# Patient Record
Sex: FEMALE | Race: WHITE | Employment: OTHER | URBAN - METROPOLITAN AREA
[De-identification: names, ages, dates, MRNs, and addresses within clinical notes are randomized per-mention and may not be internally consistent; named-entity substitution may affect disease eponyms.]

---

## 2020-10-12 ENCOUNTER — TRANSFERRED RECORDS (OUTPATIENT)
Dept: HEALTH INFORMATION MANAGEMENT | Facility: CLINIC | Age: 70
End: 2020-10-12

## 2020-10-19 ENCOUNTER — MEDICAL CORRESPONDENCE (OUTPATIENT)
Dept: HEALTH INFORMATION MANAGEMENT | Facility: CLINIC | Age: 70
End: 2020-10-19

## 2020-10-19 ENCOUNTER — TRANSFERRED RECORDS (OUTPATIENT)
Dept: HEALTH INFORMATION MANAGEMENT | Facility: CLINIC | Age: 70
End: 2020-10-19

## 2020-10-20 ENCOUNTER — OFFICE VISIT (OUTPATIENT)
Dept: OPHTHALMOLOGY | Facility: CLINIC | Age: 70
End: 2020-10-20
Attending: OPHTHALMOLOGY
Payer: COMMERCIAL

## 2020-10-20 DIAGNOSIS — H16.012 CENTRAL CORNEAL ULCER OF LEFT EYE: Primary | ICD-10-CM

## 2020-10-20 LAB
GRAM STN SPEC: NORMAL
GRAM STN SPEC: NORMAL
SPECIMEN SOURCE: NORMAL

## 2020-10-20 PROCEDURE — 65430 CORNEAL SMEAR: CPT | Performed by: OPHTHALMOLOGY

## 2020-10-20 PROCEDURE — 99204 OFFICE O/P NEW MOD 45 MIN: CPT | Mod: 25 | Performed by: OPHTHALMOLOGY

## 2020-10-20 PROCEDURE — G0463 HOSPITAL OUTPT CLINIC VISIT: HCPCS

## 2020-10-20 PROCEDURE — 87102 FUNGUS ISOLATION CULTURE: CPT | Performed by: OPHTHALMOLOGY

## 2020-10-20 PROCEDURE — 87205 SMEAR GRAM STAIN: CPT | Performed by: OPHTHALMOLOGY

## 2020-10-20 PROCEDURE — 87076 CULTURE ANAEROBE IDENT EACH: CPT | Performed by: OPHTHALMOLOGY

## 2020-10-20 PROCEDURE — 92285 EXTERNAL OCULAR PHOTOGRAPHY: CPT | Performed by: OPHTHALMOLOGY

## 2020-10-20 PROCEDURE — 87075 CULTR BACTERIA EXCEPT BLOOD: CPT | Performed by: OPHTHALMOLOGY

## 2020-10-20 PROCEDURE — 87070 CULTURE OTHR SPECIMN AEROBIC: CPT | Performed by: OPHTHALMOLOGY

## 2020-10-20 RX ORDER — OMEPRAZOLE 40 MG/1
40 CAPSULE, DELAYED RELEASE ORAL
COMMUNITY
Start: 2020-05-11

## 2020-10-20 RX ORDER — PHENOL 1.4 %
600 AEROSOL, SPRAY (ML) MUCOUS MEMBRANE
COMMUNITY
Start: 2020-05-29

## 2020-10-20 RX ORDER — GABAPENTIN 300 MG/1
600 CAPSULE ORAL
COMMUNITY
Start: 2020-05-22

## 2020-10-20 RX ORDER — BUSPIRONE HYDROCHLORIDE 30 MG/1
30 TABLET ORAL
COMMUNITY
Start: 2020-08-28

## 2020-10-20 RX ORDER — MULTIPLE VITAMINS W/ MINERALS TAB 9MG-400MCG
1 TAB ORAL
COMMUNITY
Start: 2020-06-19

## 2020-10-20 RX ORDER — HYDROXYZINE HYDROCHLORIDE 25 MG/1
25 TABLET, FILM COATED ORAL
COMMUNITY
Start: 2020-06-03

## 2020-10-20 RX ORDER — MOXIFLOXACIN 5 MG/ML
1 SOLUTION/ DROPS OPHTHALMIC
COMMUNITY
End: 2020-10-22

## 2020-10-20 RX ORDER — ACETAMINOPHEN 500 MG
500-1000 TABLET ORAL
COMMUNITY
Start: 2020-08-19

## 2020-10-20 RX ORDER — TOBRAMYCIN 3 MG/ML
1 SOLUTION/ DROPS OPHTHALMIC
COMMUNITY
End: 2020-10-22

## 2020-10-20 RX ORDER — CAPSAICIN 0.025 %
CREAM (GRAM) TOPICAL
COMMUNITY

## 2020-10-20 RX ORDER — DULOXETIN HYDROCHLORIDE 30 MG/1
30 CAPSULE, DELAYED RELEASE ORAL
COMMUNITY
Start: 2020-05-22

## 2020-10-20 RX ORDER — ALBUTEROL SULFATE 90 UG/1
2 AEROSOL, METERED RESPIRATORY (INHALATION)
COMMUNITY
Start: 2020-04-24

## 2020-10-20 RX ORDER — AMOXICILLIN 250 MG
2 CAPSULE ORAL
COMMUNITY
Start: 2020-05-11

## 2020-10-20 RX ORDER — ASPIRIN 81 MG/1
81 TABLET, CHEWABLE ORAL
COMMUNITY
Start: 2020-06-04

## 2020-10-20 RX ORDER — ROSUVASTATIN CALCIUM 10 MG/1
10 TABLET, COATED ORAL
COMMUNITY
Start: 2020-04-24

## 2020-10-20 RX ORDER — CARBOXYMETHYLCELLULOSE SODIUM 10 MG/ML
1 GEL OPHTHALMIC
COMMUNITY

## 2020-10-20 RX ORDER — FLUOXETINE 40 MG/1
40 CAPSULE ORAL
COMMUNITY

## 2020-10-20 ASSESSMENT — CONF VISUAL FIELD
OS_SUPERIOR_TEMPORAL_RESTRICTION: 1
METHOD: COUNTING FINGERS
OS_INFERIOR_NASAL_RESTRICTION: 1
OD_NORMAL: 1
OS_INFERIOR_TEMPORAL_RESTRICTION: 1
OS_SUPERIOR_NASAL_RESTRICTION: 1

## 2020-10-20 ASSESSMENT — VISUAL ACUITY
OS_CC: CF @ 2'
METHOD: SNELLEN - LINEAR
OD_CC: 20/30
CORRECTION_TYPE: GLASSES

## 2020-10-20 ASSESSMENT — REFRACTION_WEARINGRX
OS_AXIS: 107
OS_CYLINDER: +0.25
OD_SPHERE: -1.25
SPECS_TYPE: BIFOCAL
OD_ADD: +2.50
OD_AXIS: 054
OS_SPHERE: -1.00
OD_CYLINDER: +0.50
OS_ADD: +2.50

## 2020-10-20 ASSESSMENT — TONOMETRY
OS_IOP_MMHG: 08
IOP_METHOD: ICARE
OD_IOP_MMHG: 10

## 2020-10-20 ASSESSMENT — EXTERNAL EXAM - RIGHT EYE: OD_EXAM: NORMAL

## 2020-10-20 ASSESSMENT — EXTERNAL EXAM - LEFT EYE: OS_EXAM: NORMAL

## 2020-10-20 NOTE — PROGRESS NOTES
CC:  Corneal ulcer OS    Referring provider: Tulio Pop    HPI:  Gloria Rinaldi is a(n) 70 year old female who presents for non-healing corneal ulcer. She had vision loss over the course of the past ~2 months. Then over the past week, she developed severe pain, which started last Thursday. Pain is a lot worse today than yesterday. Has been on Moxi and tobra q2h alternating which hasn't helped.     POHx:    No h/o diabetes.     Glasses: Yes  CTL wearer: No    Family hx of eye disease: None  Social Hx: (current every day smoker) smoking, (-) EtOH, (-) illicit drugs    Meds:  Moxifloxacin q2h alternating  Tobramycin q2h alternating    Surgical Hx:  CEIOL each eye  [facility] right eye- 10/11/20    A/P:  #Corneal ulcer, left eye  - Possibly neurotrophic with h/o subacute vision loss over months, possibly with superimposed bacterial keratitis  - Attempted corneal sensation testing but patient too uncomfortable  - culture at Dr. Pop's office 10/11/20 were NGTD  - repeat cultures today 10/20/20 (on moxi and tobra for at least 9 days)  Fortified Vanco and Tobra left eye q 1 hour.  Cultures today    #Right eye, Dry eye - Systane Ultra PF or Celluvisc right eye every 2-3  hours.    #Pseudophakia each eye  - s/p [facility] right eye recently    Spend 40 minutes with pt for discussion and planning.  Time Statement Example:     I spent a total of ____40____ minutes face to face with _the patient___during today's office visit. Over 50% of this time was spent counseling the patient and/or coordinating care regarding__her dry eyes__and ocular infection____.     Follow up:     Thursday, weekend Tues and thurs (next week)    Call sooner if worsens    Kassy Whitney MD  Cornea & External Disease Fellow    Attending Physician Attestation:  Complete documentation of historical and exam elements from today's encounter can be found in the full encounter summary report (not reduplicated in this progress note).  I personally obtained the  chief complaint(s) and history of present illness.  I confirmed and edited as necessary the review of systems, past medical/surgical history, family history, social history, and examination findings as documented by others; and I examined the patient myself.  I personally reviewed the relevant tests, images, and reports as documented above.  I formulated and edited as necessary the assessment and plan and discussed the findings and management plan with the patient and family. - Lv Kapoor MD   --    Attending Physician Attestation: I was present for the key portions of the procedure and immediately  available for the remainder. - Lv Kapoor MD

## 2020-10-20 NOTE — PATIENT INSTRUCTIONS
Systane Ultra or Refresh Celluvisc - use 6x/day in BOTH EYES. Separate vials for each eye.    STOP the antibiotics you were previously using.      To the LEFT EYE:    BEGIN FORTIFIED VANCOMYCIN EVERY 1 HOUR (must be refrigerated) AROUND THE CLOCK    BEGIN FORTIFIED TOBRAMYCIN EVERY 1 HOUR (must be refrigerated) AROUND THE CLOCK    Starting Wed 10/21, YOU CAN USE EVERY 1 HOUR WHILE AWAKE, AND EVERY 2 HOURS OVERNIGHT

## 2020-10-20 NOTE — LETTER
10/20/2020       RE: Gloria Rinaldi  [address]  [place] [address]     Dear Colleague,    Thank you for referring your patient, Gloria Rinaldi, to the [facility] at [facility]. Please see a copy of my visit note below.      HPI:  Gloria Rinaldi is a(n) 70 year old female who presents for non-healing corneal ulcer. She had vision loss over the course of the past ~2 months. Then over the past week, she developed severe pain, which started last Thursday. Pain is a lot worse today than yesterday. Has been on Moxi and tobra q2h alternating which hasn't helped.     POHx:    No h/o diabetes.     Glasses: Yes  CTL wearer: No    Family hx of eye disease: None  Social Hx: (current every day smoker) smoking, (-) EtOH, (-) illicit drugs    Meds:  Moxifloxacin q2h alternating  Tobramycin q2h alternating    Surgical Hx:  CEIOL each eye  [facility] right eye- 10/11/20    A/P:  #Corneal ulcer, left eye  - Possibly neurotrophic with h/o subacute vision loss over months, possibly with superimposed bacterial keratitis  - Attempted corneal sensation testing but patient too uncomfortable  - culture at Dr. Pop's office 10/11/20 were NGTD  - repeat cultures today 10/20/20 (on moxi and tobra for at least 9 days)  Fortified Vanco and Tobra left eye q 1 hour.  Cultures today    right eye Dry eye - Systane Ultra PF or Celluvisc right eye every 2-3  hours.      #Pseudophakia each eye  - s/p [facility] right eye recently    Spend 40 minutes with pt for discussion and planning.      Follow up:     Thursday, weekend Tues and thurs (next week)    Call sooner if worsens      CC:  Corneal ulcer OS    Referring provider: Alison    HPI:  Gloria Rinaldi is a(n) 70 year old female who presents for non-healing corneal ulcer. She had vision loss over the course of the past ~2 months. Then over the past week, she developed severe pain, which started last Thursday. Pain is a lot worse today  than yesterday. Has been on Moxi and tobra q2h alternating which hasn't helped.     POHx:    No h/o diabetes.     Glasses: Yes  CTL wearer: No    Family hx of eye disease: None  Social Hx: (current every day smoker) smoking, (-) EtOH, (-) illicit drugs    Meds:  Moxifloxacin q2h alternating  Tobramycin q2h alternating    Surgical Hx:  CEIOL each eye  [facility] right eye- 10/11/20    A/P:  #Corneal ulcer, left eye  - Possibly neurotrophic with h/o subacute vision loss over months, possibly with superimposed bacterial keratitis  - Attempted corneal sensation testing but patient too uncomfortable  - culture at Dr. Pop's office 10/11/20 were NGTD  - repeat cultures today 10/20/20 (on moxi and tobra for at least 9 days)  Fortified Vanco and Tobra left eye q 1 hour.  Cultures today    right eye Dry eye - Systane Ultra PF or Celluvisc right eye every 2-3  hours.      #Pseudophakia each eye  - s/p [facility] right eye recently    Spend 40 minutes with pt for discussion and planning.      Follow up:     Thursday, weekend Tues and thurs (next week)    Call sooner if worsens      Again, thank you for allowing me to participate in the care of your patient.      Sincerely,    Lv Kapoor MD

## 2020-10-20 NOTE — NURSING NOTE
Chief Complaints and History of Present Illnesses   Patient presents with     Corneal Ulcer Evaluation     Non healing central corneal ulcer      Chief Complaint(s) and History of Present Illness(es)     Corneal Ulcer Evaluation     Laterality: left eye    Comments: Non healing central corneal ulcer               Comments     Non healing corneal ulcer LE.  Pt states that the ulcer has gotten much worse over the past month. Pt thought that she was seeing  A floater in LE and not an ulcer.  Continuous stabbing eye pain in LE. Pt also notes constant headache around LE for the past 3 days.  Pt currently taking Moxifloxicin and tobramycin Q2H Left Eye    JETHRO Florian October 20, 2020 2:05 PM

## 2020-10-22 ENCOUNTER — OFFICE VISIT (OUTPATIENT)
Dept: OPHTHALMOLOGY | Facility: CLINIC | Age: 70
End: 2020-10-22
Attending: STUDENT IN AN ORGANIZED HEALTH CARE EDUCATION/TRAINING PROGRAM
Payer: COMMERCIAL

## 2020-10-22 DIAGNOSIS — Z96.1 PSEUDOPHAKIA OF BOTH EYES: ICD-10-CM

## 2020-10-22 DIAGNOSIS — H04.123 DRY EYE SYNDROME OF BOTH EYES: ICD-10-CM

## 2020-10-22 DIAGNOSIS — H16.012 CENTRAL CORNEAL ULCER OF LEFT EYE: Primary | ICD-10-CM

## 2020-10-22 DIAGNOSIS — H16.233 NEUROTROPHIC KERATOCONJUNCTIVITIS OF BOTH EYES: ICD-10-CM

## 2020-10-22 PROCEDURE — G0463 HOSPITAL OUTPT CLINIC VISIT: HCPCS

## 2020-10-22 PROCEDURE — 99213 OFFICE O/P EST LOW 20 MIN: CPT | Performed by: STUDENT IN AN ORGANIZED HEALTH CARE EDUCATION/TRAINING PROGRAM

## 2020-10-22 ASSESSMENT — REFRACTION_WEARINGRX
OS_AXIS: 107
OS_CYLINDER: +0.25
OS_ADD: +2.50
OD_ADD: +2.50
SPECS_TYPE: BIFOCAL
OS_SPHERE: -1.00
OD_AXIS: 054
OD_SPHERE: -1.25
OD_CYLINDER: +0.50

## 2020-10-22 ASSESSMENT — EXTERNAL EXAM - RIGHT EYE: OD_EXAM: NORMAL

## 2020-10-22 ASSESSMENT — VISUAL ACUITY
METHOD: SNELLEN - LINEAR
OD_PH_SC: 20/25
OS_SC: HM
OD_SC+: -2
OD_SC: 20/30

## 2020-10-22 ASSESSMENT — CONF VISUAL FIELD
OD_NORMAL: 1
OS_INFERIOR_NASAL_RESTRICTION: 1
OS_SUPERIOR_NASAL_RESTRICTION: 1
OS_INFERIOR_TEMPORAL_RESTRICTION: 1
OS_SUPERIOR_TEMPORAL_RESTRICTION: 1

## 2020-10-22 ASSESSMENT — TONOMETRY
IOP_METHOD: ICARE
OS_IOP_MMHG: 05
OD_IOP_MMHG: 09

## 2020-10-22 ASSESSMENT — EXTERNAL EXAM - LEFT EYE: OS_EXAM: NORMAL

## 2020-10-22 NOTE — NURSING NOTE
Chief Complaints and History of Present Illnesses   Patient presents with     Corneal Ulcer Follow Up     1 day follow up corneal ulcer LE     Chief Complaint(s) and History of Present Illness(es)     Corneal Ulcer Follow Up     Laterality: left eye    Comments: 1 day follow up corneal ulcer LE              Comments     Pt states vision is the same as yesterday. LE is still very light sensitive.  Occasional bursts of eye pain in LE. Pt also notes constant headache across forehead.  LE  to touch, but better than yesterday.    JETHRO Florian October 22, 2020 9:49 AM

## 2020-10-22 NOTE — PATIENT INSTRUCTIONS
Systane Ultra or Refresh Celluvisc - use 6x/day in BOTH EYES. Separate vials for each eye.    STOP the antibiotics you were previously using.      To the LEFT EYE:    CONTINUE FORTIFIED VANCOMYCIN EVERY 1 HOUR WHILE AWAKE    BEGIN FORTIFIED TOBRAMYCIN EVERY 1 HOUR WHILE AWAKE    Use drops overnight if you wake up.         CALL [phone] IF ANY WORSENING OVER THE WEEKEND

## 2020-10-22 NOTE — PROGRESS NOTES
CC:  Corneal ulcer OS    Referring provider: Tulio Pop    HPI:  Gloria Rinaldi is a(n) 70 year old female who presents for non-healing corneal ulcer. She had vision loss over the course of the past ~2 months. Then over the past week, she developed severe pain, which started last Thursday. Pain is a lot worse today than yesterday. Has been on Moxi and tobra q2h alternating which hasn't helped.     Interval HPI: Pain is slightly improved. Still very photophobic, but now able to open the eye spontaneously which is an improvement. Vision remains poor. No worsening discharge. No new flashes, floaters, or diplopia. No pain, redness, or tearing.     POHx:    No h/o diabetes.     Glasses: Yes  CTL wearer: No    Family hx of eye disease: None  Social Hx: (current every day smoker) smoking, (-) EtOH, (-) illicit drugs    Meds:  Fort Vanc q1h while awake, q2h overnight  Fort Tobra q1h while awake, q2h overnight    Previous: Moxifloxacin q2h alternating, Tobramycin q2h alternating    Surgical Hx:  CEIOL each eye  [facility] right eye- 10/11/20    A/P:  #Corneal ulcer, left eye  - Possibly neurotrophic with h/o subacute vision loss over months, possibly with superimposed bacterial keratitis  - Attempted corneal sensation testing but patient too photophobic  - culture at Dr. Pop's office 10/11/20 were NGTD  - repeat cultures today 10/20/20 (on moxi and tobra for at least 9 days) - NGTD  - 10/22: ulcer slightly smaller in size (now 1.5x.1.5 mm), remains well circumscribed, no hypopyon, pain IMPROVED    Plan:  Decrease Fortified Vanco and Tobra left eye q 1 hour while awake  Stressed importance of calling over the weekend if any worsening at all -- will notify on call resident so they are aware.     #Right eye, Dry eye - Systane Ultra PF or Celluvisc right eye every 2-3  hours.    #Pseudophakia each eye  - s/p [facility] right eye recently      Follow up: Tues 10/27 as scheduled. Repeat SLP.    Kassy Whitney MD  Cornea &  External Disease Fellow    Attending Physician Attestation:  Complete documentation of historical and exam elements from today's encounter can be found in the full encounter summary report (not reduplicated in this progress note).  I personally obtained the chief complaint(s) and history of present illness.  I confirmed and edited as necessary the review of systems, past medical/surgical history, family history, social history, and examination findings as documented by others; and I examined the patient myself.  I personally reviewed the relevant ophthalmic tests, images, and reports as documented above (if applicable). I agree with the interpretation(s) as documented by the resident and have edited the corresponding report(s) as necessary. I formulated and edited as necessary the assessment and plan and discussed the findings and management plan with the patient and family.     Kassy Whitney MD

## 2020-10-23 ENCOUNTER — TELEPHONE (OUTPATIENT)
Dept: OPHTHALMOLOGY | Facility: CLINIC | Age: 70
End: 2020-10-23

## 2020-10-23 NOTE — TELEPHONE ENCOUNTER
Lab calling with results 10- at 1350    Left eye cornea culture collected 10-    Result today:  Light growth Cuti bacterium Acnes     Pt on fortified antibiotics and last eye visit yesterday with upcoming visit next Tuesday    Note to Dr. Kapoor/Ellie Argueta in clinic today    Luis Fernando Tsai RN 1:53 PM 10/23/20

## 2020-10-23 NOTE — TELEPHONE ENCOUNTER
Called patient and LVM 10/23 at 5:15 PM. Let her know we have some information from cultures but I wouldn't change eye drops at this time. See her 10/27 as scheduled, or sooner prn.

## 2020-10-26 LAB
BACTERIA SPEC CULT: ABNORMAL
SPECIMEN SOURCE: ABNORMAL

## 2020-10-27 ENCOUNTER — OFFICE VISIT (OUTPATIENT)
Dept: OPHTHALMOLOGY | Facility: CLINIC | Age: 70
End: 2020-10-27
Attending: OPHTHALMOLOGY
Payer: COMMERCIAL

## 2020-10-27 DIAGNOSIS — H04.123 DRY EYE SYNDROME OF BOTH EYES: ICD-10-CM

## 2020-10-27 DIAGNOSIS — H16.012 CENTRAL CORNEAL ULCER OF LEFT EYE: Primary | ICD-10-CM

## 2020-10-27 DIAGNOSIS — H16.233 NEUROTROPHIC KERATOCONJUNCTIVITIS OF BOTH EYES: ICD-10-CM

## 2020-10-27 DIAGNOSIS — H16.012 CENTRAL CORNEAL ULCER OF LEFT EYE: ICD-10-CM

## 2020-10-27 LAB
BACTERIA SPEC CULT: ABNORMAL
Lab: ABNORMAL
SPECIMEN SOURCE: ABNORMAL

## 2020-10-27 PROCEDURE — G0463 HOSPITAL OUTPT CLINIC VISIT: HCPCS

## 2020-10-27 PROCEDURE — 99213 OFFICE O/P EST LOW 20 MIN: CPT | Mod: GC | Performed by: OPHTHALMOLOGY

## 2020-10-27 ASSESSMENT — VISUAL ACUITY
OD_SC+: +2
METHOD: SNELLEN - LINEAR
OS_SC: CF @ 1'
OD_SC: 20/60

## 2020-10-27 ASSESSMENT — TONOMETRY
IOP_METHOD: ICARE
OS_IOP_MMHG: 4
OD_IOP_MMHG: 11

## 2020-10-27 ASSESSMENT — EXTERNAL EXAM - RIGHT EYE: OD_EXAM: NORMAL

## 2020-10-27 ASSESSMENT — EXTERNAL EXAM - LEFT EYE: OS_EXAM: NORMAL

## 2020-10-27 NOTE — PROGRESS NOTES
"CC:  Corneal ulcer OS    Referring provider: Tulio Pop    HPI:  Gloria Rinaldi is a(n) 70 year old female who presents for non-healing corneal ulcer. She had vision loss over the course of the past ~2 months. Then over the past week, she developed severe pain, which started last Thursday. Pain is a lot worse today than yesterday. Has been on Moxi and tobra q2h alternating which hasn't helped.     Interval HPI: Patient reports pain \"finally went away\" on Sunday afternoon. Still mildly irritated and photophobic. She still has a headache. Vision about the same.     POHx:    No h/o diabetes.     Glasses: Yes  CTL wearer: No    Family hx of eye disease: None  Social Hx: (current every day smoker) smoking, (-) EtOH, (-) illicit drugs    Meds:  Fort Vanc q1h while awake  Fort Tobra q1h while awake    Previous: Moxifloxacin q2h alternating, Tobramycin q2h alternating    Surgical Hx:  CEIOL each eye  [facility] right eye- 10/11/20    A/P:  #Corneal ulcer, left eye  - Possibly neurotrophic with h/o subacute vision loss over months, now with superimposed bacterial keratitis  - Attempted corneal sensation testing but patient too photophobic  - culture at Dr. Pop's office 10/11/20 were NGTD  - repeat cultures today 10/20/20 (on moxi and tobra for at least 9 days) - P acnes in broth only   - 10/22: ulcer slightly smaller in size (now 1.5x.1.5 mm), remains well circumscribed, no hypopyon, pain improved  - 10/27: ulcer much less dense and smaller, epi defect nearly resolved (0.5 mm), pain improved    Plan:  Decrease Fortified Vanco and Tobra left eye - every 2 hours, and one administration overnight  Start Erythromycin ointment QID - use after drops    #Right eye, Dry eye - Systane Ultra PF or Celluvisc right eye every 2-3  hours.    #Pseudophakia each eye  - s/p [facility] right eye recently      Follow up: Thurs 10/29    Kassy Whitney MD  Cornea & External Disease Fellow    Attending Physician Attestation:  Complete " documentation of historical and exam elements from today's encounter can be found in the full encounter summary report (not reduplicated in this progress note).  I personally obtained the chief complaint(s) and history of present illness.  I confirmed and edited as necessary the review of systems, past medical/surgical history, family history, social history, and examination findings as documented by others; and I examined the patient myself.  I personally reviewed the relevant tests, images, and reports as documented above.  I formulated and edited as necessary the assessment and plan and discussed the findings and management plan with the patient and family. - Lv Kapoor MD

## 2020-10-27 NOTE — PATIENT INSTRUCTIONS
Systane Ultra or Refresh Celluvisc - use 6x/day in BOTH EYES. Separate vials for each eye.        To the LEFT EYE:    CONTINUE FORTIFIED VANCOMYCIN EVERY 2 HOURS WHILE AWAKE, and ONE drop overnight    CONTINUE FORTIFIED TOBRAMYCIN EVERY 2 HOURS WHILE AWAKE, and ONE drop overnight

## 2020-10-27 NOTE — NURSING NOTE
Chief Complaints and History of Present Illnesses   Patient presents with     Corneal Ulcer Follow Up      Chief Complaint(s) and History of Present Illness(es)     Corneal Ulcer Follow Up     Laterality: both eyes    Associated symptoms: eye pain, photophobia, tearing and discharge    Treatments tried: eye drops              Comments     Complains of the left eye pain without light 2/10 and pain with light 10/10.  No changes to vision right eye, says faint irritation right eye.  Eye meds: tobradex and vancomycin to left eye, right eye AT's (not using)   AKILA Baez 10/27/2020 10:12 AM

## 2020-10-29 ENCOUNTER — OFFICE VISIT (OUTPATIENT)
Dept: OPHTHALMOLOGY | Facility: CLINIC | Age: 70
End: 2020-10-29
Attending: OPHTHALMOLOGY
Payer: COMMERCIAL

## 2020-10-29 DIAGNOSIS — H04.123 DRY EYE SYNDROME OF BOTH EYES: ICD-10-CM

## 2020-10-29 DIAGNOSIS — H16.012 CENTRAL CORNEAL ULCER OF LEFT EYE: ICD-10-CM

## 2020-10-29 DIAGNOSIS — H16.233 NEUROTROPHIC KERATOCONJUNCTIVITIS OF BOTH EYES: ICD-10-CM

## 2020-10-29 DIAGNOSIS — H16.012 CENTRAL CORNEAL ULCER OF LEFT EYE: Primary | ICD-10-CM

## 2020-10-29 PROCEDURE — G0463 HOSPITAL OUTPT CLINIC VISIT: HCPCS

## 2020-10-29 PROCEDURE — 99213 OFFICE O/P EST LOW 20 MIN: CPT | Mod: GC | Performed by: OPHTHALMOLOGY

## 2020-10-29 RX ORDER — ERYTHROMYCIN 5 MG/G
0.5 OINTMENT OPHTHALMIC 4 TIMES DAILY
Qty: 3.5 G | Refills: 3 | Status: CANCELLED | OUTPATIENT
Start: 2020-10-29

## 2020-10-29 ASSESSMENT — VISUAL ACUITY
OS_SC: HM
OD_SC: 20/40
METHOD: SNELLEN - LINEAR

## 2020-10-29 ASSESSMENT — TONOMETRY
IOP_METHOD: ICARE
OD_IOP_MMHG: 10
OS_IOP_MMHG: 7

## 2020-10-29 ASSESSMENT — EXTERNAL EXAM - LEFT EYE: OS_EXAM: NORMAL

## 2020-10-29 ASSESSMENT — EXTERNAL EXAM - RIGHT EYE: OD_EXAM: NORMAL

## 2020-10-29 NOTE — NURSING NOTE
Chief Complaints and History of Present Illnesses   Patient presents with     Follow Up      Chief Complaint(s) and History of Present Illness(es)     Follow Up     Laterality: left eye    Associated symptoms: eye pain, tearing and discharge    Treatments tried: artificial tears    Pain scale: 5/10              Comments     Follow up of K ulcer left eye.  Pain left eye has increased to 5/10.  Says left eye is very irritated.  Patient is asking if she can cover her left eye with an eye patch to block out light.  Eye meds: using Systane Ultra PF or Celluvisc right eye every 2-3  Hours, Systane PF, Fortified Vanco and Tobra left eye - every 2 hours, and one administration overnight (no EES kaiden)  AKILA Baez 10/29/2020 10:04 AM

## 2020-10-29 NOTE — PATIENT INSTRUCTIONS
1. Reduce the fortified Vancomycin and Tobramycin every 3 hours in the LEFT eye (about 5x/day).    2. Start Refresh PM or Systane PM every 3 hours in the LEFT eye. Always use at least 2 minutes AFTER the fortified antibiotics, never before.     3. Continue Systane Ultra preservative free drops in the right eye.     4. You can  a black eye patch from [employer] or [employer] to cover the eye and help with the light sensitivity.

## 2020-10-29 NOTE — PROGRESS NOTES
CC:  Corneal ulcer OS    Referring provider: Tulio Pop    HPI:  Gloria Rinaldi is a(n) 70 year old female who presents for non-healing corneal ulcer. She had vision loss over the course of the past ~2 months. Then over the past week, she developed severe pain, which started last Thursday. Pain is a lot worse today than yesterday. Has been on Moxi and tobra q2h alternating which hasn't helped.     Interval HPI: The patient reports constant pain since decreased the frequency of her fortified antibiotics 2 days ago (Q2H WA and 1x/overnight). The pain is located on the top and outer cornea. It is not a stabbing or piercing pain. She has not started the erythromycin ophthalmic ointment yet - she never received it.     POHx:    No h/o diabetes.     Glasses: Yes  CTL wearer: No    Family hx of eye disease: None  Social Hx: (current every day smoker) smoking, (-) EtOH, (-) illicit drugs    Meds:  Fort Vanc q1h while awake  Fort Tobra q1h while awake    Previous: Moxifloxacin q2h alternating, Tobramycin q2h alternating    Surgical Hx:  CEIOL each eye  [facility] right eye- 10/11/20    A/P:  #Corneal ulcer, left eye  - Possibly neurotrophic with h/o subacute vision loss over months, now with superimposed bacterial keratitis  - Attempted corneal sensation testing but patient too photophobic  - culture at Dr. Pop's office 10/11/20 were NGTD  - repeat cultures today 10/20/20 (on moxi and tobra for at least 9 days) - P acnes in broth only   - 10/22: ulcer slightly smaller in size (now 1.5x.1.5 mm), remains well circumscribed, no hypopyon, pain improved  - 10/27: ulcer much less dense and smaller, epi defect nearly resolved (0.5 mm), pain improved  - 10/29: stable appearance, residual epi defect (0.5mm)    Plan:  - Continue Fortified Vanco and Tobra left eye - every 2 hours, and one administration overnight  - Start refresh pm ointment QID - use after drops    #Right eye, Dry eye - Systane Ultra PF or Celluvisc right eye  every 2-3 hours.    #Pseudophakia each eye  - s/p [facility] right eye recently      Follow up: 11/3, or call if problems.    Tarik Mendoza MD  Ophthalmology PGY-3  [facility]     Attending Physician Attestation:  Complete documentation of historical and exam elements from today's encounter can be found in the full encounter summary report (not reduplicated in this progress note).  I personally obtained the chief complaint(s) and history of present illness.  I confirmed and edited as necessary the review of systems, past medical/surgical history, family history, social history, and examination findings as documented by others; and I examined the patient myself.  I personally reviewed the relevant tests, images, and reports as documented above.  I formulated and edited as necessary the assessment and plan and discussed the findings and management plan with the patient and family. - Lv Kapoor MD

## 2020-11-03 ENCOUNTER — OFFICE VISIT (OUTPATIENT)
Dept: OPHTHALMOLOGY | Facility: CLINIC | Age: 70
End: 2020-11-03
Attending: OPHTHALMOLOGY
Payer: COMMERCIAL

## 2020-11-03 DIAGNOSIS — H04.123 DRY EYE SYNDROME OF BOTH EYES: ICD-10-CM

## 2020-11-03 DIAGNOSIS — H16.233 NEUROTROPHIC KERATOCONJUNCTIVITIS OF BOTH EYES: ICD-10-CM

## 2020-11-03 DIAGNOSIS — H16.012 CENTRAL CORNEAL ULCER OF LEFT EYE: Primary | ICD-10-CM

## 2020-11-03 PROCEDURE — G0463 HOSPITAL OUTPT CLINIC VISIT: HCPCS

## 2020-11-03 PROCEDURE — 99213 OFFICE O/P EST LOW 20 MIN: CPT | Mod: GC | Performed by: OPHTHALMOLOGY

## 2020-11-03 RX ORDER — MINERAL OIL, PETROLATUM 425; 573 MG/G; MG/G
1 OINTMENT OPHTHALMIC 4 TIMES DAILY
COMMUNITY

## 2020-11-03 ASSESSMENT — VISUAL ACUITY
OS_SC: HM
OD_SC: 20/70
METHOD: SNELLEN - LINEAR
OD_SC+: -2

## 2020-11-03 ASSESSMENT — TONOMETRY
OS_IOP_MMHG: 3
OD_IOP_MMHG: 5
IOP_METHOD: ICARE

## 2020-11-03 ASSESSMENT — EXTERNAL EXAM - RIGHT EYE: OD_EXAM: NORMAL

## 2020-11-03 ASSESSMENT — CONF VISUAL FIELD
OS_INFERIOR_NASAL_RESTRICTION: 1
OD_NORMAL: 1
METHOD: COUNTING FINGERS
OS_SUPERIOR_TEMPORAL_RESTRICTION: 1
OS_INFERIOR_TEMPORAL_RESTRICTION: 1
OS_SUPERIOR_NASAL_RESTRICTION: 1

## 2020-11-03 ASSESSMENT — EXTERNAL EXAM - LEFT EYE: OS_EXAM: NORMAL

## 2020-11-03 NOTE — PATIENT INSTRUCTIONS
1. Reduce the fortified Vancomycin and Tobramycin to 4x/day in the LEFT eye.    2. Continue Refresh PM or Systane PM every 4 hours in the LEFT eye. Always use at least 2 minutes AFTER the fortified antibiotics, never before.     3. Continue Systane Ultra preservative free drops in the right eye.     You can  a black eye patch from [employer] or [employer] to cover the eye and help with the light sensitivity.     USE COLD COMPRESSES FOR ITCHING. DO NOT TOUCH BOTTLE OF EYE DROPS TO THE EYE.

## 2020-11-03 NOTE — PROGRESS NOTES
CC:  Corneal ulcer OS    Referring provider: Tulio Pop    HPI:  Gloria Rinaldi is a(n) 70 year old female who presents for non-healing corneal ulcer. She had vision loss over the course of the past ~2 months. Then over the past week, she developed severe pain, which started last Thursday. Pain is a lot worse today than yesterday. Has been on Moxi and tobra q2h alternating which hasn't helped.     Interval HPI: Pain is improved. Vision is stable. She has a lot of itching and is hoping that's a good sign.  No new flashes, floaters, or diplopia. No pain, redness, or tearing.     POHx:    No h/o diabetes.     Glasses: Yes  CTL wearer: No    Family hx of eye disease: None  Social Hx: (current every day smoker) smoking, (-) EtOH, (-) illicit drugs    Meds:  Fort Vanc q3h while awake (about 6x)  Fort Tobra q3h while awake    Previous: Moxifloxacin q2h alternating, Tobramycin q2h alternating    Surgical Hx:  CEIOL each eye  [facility] right eye- 10/11/20    A/P:  #Corneal ulcer, left eye  - Possibly neurotrophic with h/o subacute vision loss over months, now with superimposed bacterial keratitis  - Attempted corneal sensation testing but patient too photophobic  - culture at Dr. Pop's office 10/11/20 were NGTD  - repeat cultures today 10/20/20 (on moxi and tobra for at least 9 days) - P acnes in broth only   - 10/22: ulcer slightly smaller in size (now 1.5x.1.5 mm), remains well circumscribed, no hypopyon, pain improved  - 10/27: ulcer much less dense and smaller, epi defect nearly resolved (0.5 mm), pain improved  - 10/29: stable appearance, residual epi defect (0.5mm)  - 11/3: ulcer continues to improve, epi defect resolved    Plan:  - Fortified Vanco and Tobra left eye - decrease to QID  - Continue refresh pm ointment QID - use after drops  - discussed cold compresses for itching    #Right eye, Dry eye - Systane Ultra PF or Celluvisc right eye every 2-3 hours.    #Pseudophakia each eye  - s/p [facility] right eye  recently      Follow up: 1 week - call with any problems     Kassy Whitney MD  Cornea & External Disease Fellow    Attending Physician Attestation:  Complete documentation of historical and exam elements from today's encounter can be found in the full encounter summary report (not reduplicated in this progress note).  I personally obtained the chief complaint(s) and history of present illness.  I confirmed and edited as necessary the review of systems, past medical/surgical history, family history, social history, and examination findings as documented by others; and I examined the patient myself.  I personally reviewed the relevant tests, images, and reports as documented above.  I formulated and edited as necessary the assessment and plan and discussed the findings and management plan with the patient and family. - Lv Kapoor MD

## 2020-11-03 NOTE — NURSING NOTE
Chief Complaints and History of Present Illnesses   Patient presents with     Corneal Ulcer Follow Up     5 day follow up corneal ulcer, left eye     Chief Complaint(s) and History of Present Illness(es)     Corneal Ulcer Follow Up     Laterality: left eye    Quality: blurred vision    Severity: severe    Course: stable    Associated symptoms: eye pain, photophobia, discharge and itching.  Negative for dryness and tearing    Pain scale: 0/10    Comments: 5 day follow up corneal ulcer, left eye              Comments     Pt denies any significant vision changes LE since last visit.  Complains of LE still being light sensitive, itchy after instilling Refresh PM kaiden, occasional pain, and some discharge.  Ocular meds: Vanco/Tobra q3h & once throughout night LE, Refresh PM kaiden QID LE, & PFAT's q2-3h YNES Gibbons OT 1:37 PM November 3, 2020

## 2020-11-09 ENCOUNTER — OFFICE VISIT (OUTPATIENT)
Dept: OPHTHALMOLOGY | Facility: CLINIC | Age: 70
End: 2020-11-09
Attending: STUDENT IN AN ORGANIZED HEALTH CARE EDUCATION/TRAINING PROGRAM
Payer: COMMERCIAL

## 2020-11-09 DIAGNOSIS — H16.233 NEUROTROPHIC KERATOCONJUNCTIVITIS OF BOTH EYES: ICD-10-CM

## 2020-11-09 DIAGNOSIS — H04.123 DRY EYE SYNDROME OF BOTH EYES: ICD-10-CM

## 2020-11-09 DIAGNOSIS — H16.012 CENTRAL CORNEAL ULCER OF LEFT EYE: Primary | ICD-10-CM

## 2020-11-09 PROCEDURE — G0463 HOSPITAL OUTPT CLINIC VISIT: HCPCS

## 2020-11-09 PROCEDURE — 99213 OFFICE O/P EST LOW 20 MIN: CPT | Performed by: STUDENT IN AN ORGANIZED HEALTH CARE EDUCATION/TRAINING PROGRAM

## 2020-11-09 ASSESSMENT — CONF VISUAL FIELD
OS_NORMAL: 1
METHOD: COUNTING FINGERS
OD_NORMAL: 1

## 2020-11-09 ASSESSMENT — TONOMETRY
OS_IOP_MMHG: 06
IOP_METHOD: ICARE
OD_IOP_MMHG: 09

## 2020-11-09 ASSESSMENT — EXTERNAL EXAM - LEFT EYE: OS_EXAM: NORMAL

## 2020-11-09 ASSESSMENT — VISUAL ACUITY
OD_SC: 20/60
OS_SC: 5/200
METHOD: SNELLEN - LINEAR

## 2020-11-09 ASSESSMENT — EXTERNAL EXAM - RIGHT EYE: OD_EXAM: NORMAL

## 2020-11-09 NOTE — PROGRESS NOTES
CC:  Corneal ulcer OS    Referring provider: Tulio Pop    HPI:  Gloria Rinaldi is a(n) 70 year old female who presents for non-healing corneal ulcer. She had vision loss over the course of the past ~2 months. Then over the past week, she developed severe pain, which started last Thursday. Pain is a lot worse today than yesterday. Has been on Moxi and tobra q2h alternating which hasn't helped.     Interval HPI: Pain is improved. Photophobia persists. Vision slightly improved. Ran out of all gtts last Tuesday (6 days ago)    POHx:    No h/o diabetes.     Glasses: Yes  CTL wearer: No    Family hx of eye disease: None  Social Hx: (current every day smoker) smoking, (-) EtOH, (-) illicit drugs    Meds:  [place] Vanc q3h while awake (about 6x)  [place]ra q3h while awake    Previous: Moxifloxacin q2h alternating, Tobramycin q2h alternating    Surgical Hx:  CEIOL each eye  YPC right eye- 10/11/20    A/P:  #Corneal ulcer, left eye  - Possibly neurotrophic with h/o subacute vision loss over months, now with superimposed bacterial keratitis  - Attempted corneal sensation testing but patient too photophobic  - culture at Dr. Pop's office 10/11/20 were NGTD  - repeat cultures today 10/20/20 (on moxi and tobra for at least 9 days) - P acnes in broth only   - 10/22: ulcer slightly smaller in size (now 1.5x.1.5 mm), remains well circumscribed, no hypopyon, pain improved  - 10/27: ulcer much less dense and smaller, epi defect nearly resolved (0.5 mm), pain improved  - 10/29: stable appearance, residual epi defect (0.5mm)  - 11/3: ulcer continues to improve, epi defect resolved  - 11/9: stable, epi defect resolved    Plan:  - Fortified Vanco and Tobra QID left eye --- pt has been off for 6 days (since 11/3). Restarting gtts today 11/9.  - Continue refresh pm ointment QID - use after drops  - discussed cold compresses for itching    #Right eye, Dry eye - Systane Ultra PF or Celluvisc right eye every 2-3  hours.    #Pseudophakia each eye  - s/p [facility] right eye recently    Follow up: 1 week -- sooner naveedn    Kassy Whitney MD  Cornea & External Disease Fellow    Attending Physician Attestation:  Complete documentation of historical and exam elements from today's encounter can be found in the full encounter summary report (not reduplicated in this progress note).  I personally obtained the chief complaint(s) and history of present illness.  I confirmed and edited as necessary the review of systems, past medical/surgical history, family history, social history, and examination findings as documented by others; and I examined the patient myself.  I personally reviewed the relevant ophthalmic tests, images, and reports as documented above (if applicable). I agree with the interpretation(s) as documented by the resident and have edited the corresponding report(s) as necessary. I formulated and edited as necessary the assessment and plan and discussed the findings and management plan with the patient and family.     Kassy Whitney MD

## 2020-11-09 NOTE — PATIENT INSTRUCTIONS
1. Restart fortified Vancomycin and Tobramycin to 4x/day in the LEFT eye.    2. Continue Refresh PM or Systane PM every 4 hours in the LEFT eye. Always use at least 2 minutes AFTER the fortified antibiotics, never before.     3. Continue Systane Ultra preservative free drops in the right eye.

## 2020-11-09 NOTE — NURSING NOTE
Chief Complaints and History of Present Illnesses   Patient presents with     Corneal Ulcer Follow Up     Chief Complaint(s) and History of Present Illness(es)     Corneal Ulcer Follow Up     Laterality: left eye    Onset: weeks ago    Quality: Feels the va has improved some      Associated symptoms: dryness (dry occ) and itching    Treatments tried: eye drops    Pain scale: 0/10              Comments     Carrie Pozo COT 1:18 PM November 9, 2020

## 2020-11-17 LAB
FUNGUS SPEC CULT: NORMAL
SPECIMEN SOURCE: NORMAL

## 2020-11-18 ENCOUNTER — TELEPHONE (OUTPATIENT)
Dept: OPHTHALMOLOGY | Facility: CLINIC | Age: 70
End: 2020-11-18

## 2020-11-18 NOTE — TELEPHONE ENCOUNTER
LVM to confirm patient s appointment for tomorrow, Thursday, 11/19/20 as well as informing them about the new COVID-19 visitor policy.  Gave patient the call back number if they have any questions and/or concerns or needs to reschedule

## 2020-11-19 ENCOUNTER — OFFICE VISIT (OUTPATIENT)
Dept: OPHTHALMOLOGY | Facility: CLINIC | Age: 70
End: 2020-11-19
Attending: OPHTHALMOLOGY
Payer: COMMERCIAL

## 2020-11-19 DIAGNOSIS — H16.012 CENTRAL CORNEAL ULCER OF LEFT EYE: Primary | ICD-10-CM

## 2020-11-19 DIAGNOSIS — H16.012 CENTRAL CORNEAL ULCER OF LEFT EYE: ICD-10-CM

## 2020-11-19 DIAGNOSIS — H16.233 NEUROTROPHIC KERATOCONJUNCTIVITIS OF BOTH EYES: ICD-10-CM

## 2020-11-19 DIAGNOSIS — H04.123 DRY EYE SYNDROME OF BOTH EYES: ICD-10-CM

## 2020-11-19 PROCEDURE — 92285 EXTERNAL OCULAR PHOTOGRAPHY: CPT | Performed by: OPHTHALMOLOGY

## 2020-11-19 PROCEDURE — 99213 OFFICE O/P EST LOW 20 MIN: CPT | Performed by: OPHTHALMOLOGY

## 2020-11-19 PROCEDURE — G0463 HOSPITAL OUTPT CLINIC VISIT: HCPCS

## 2020-11-19 ASSESSMENT — CONF VISUAL FIELD
OS_INFERIOR_TEMPORAL_RESTRICTION: 3
OS_INFERIOR_NASAL_RESTRICTION: 3
OD_NORMAL: 1
OS_SUPERIOR_TEMPORAL_RESTRICTION: 3
OS_SUPERIOR_NASAL_RESTRICTION: 3
METHOD: COUNTING FINGERS

## 2020-11-19 ASSESSMENT — TONOMETRY
OS_IOP_MMHG: 7
OD_IOP_MMHG: 12
IOP_METHOD: ICARE

## 2020-11-19 ASSESSMENT — EXTERNAL EXAM - LEFT EYE: OS_EXAM: NORMAL

## 2020-11-19 ASSESSMENT — EXTERNAL EXAM - RIGHT EYE: OD_EXAM: NORMAL

## 2020-11-19 ASSESSMENT — VISUAL ACUITY
METHOD: SNELLEN - LINEAR
OD_SC: 20/40
OD_SC+: -2
OS_SC: CF@3FT

## 2020-11-19 NOTE — NURSING NOTE
Chief Complaints and History of Present Illnesses   Patient presents with     Corneal Ulcer Follow Up     10 day follow up corneal ulcer, left eye     Chief Complaint(s) and History of Present Illness(es)     Corneal Ulcer Follow Up     Laterality: left eye    Quality: blurred vision    Severity: severe    Associated symptoms: photophobia, tearing, discharge and eye pain    Pain scale: 4/10    Comments: 10 day follow up corneal ulcer, left eye              Comments     Pt feels her vision LE has improved since last visit.  Notes photophobia has improved since last visit.  Complains of occasional achy pain LE  Complains of LE having discharge and tearing.  Ocular meds: Vanco/Tobra QID LE, Refresh kaiden QID LE/BID RE & Systane 4-6x/day YNES Gibbons OT 9:39 AM November 19, 2020

## 2020-11-19 NOTE — PROGRESS NOTES
CC:  Corneal ulcer OS    Referring provider: Tulio Pop    HPI:  Gloria Rinaldi is a(n) 70 year old female who presents for non-healing corneal ulcer. She had vision loss over the course of the past ~2 months. Then over the past week, she developed severe pain, which started last Thursday. Pain is a lot worse today than yesterday. Has been on Moxi and tobra q2h alternating which hasn't helped.     Interval HPI: Reports that pain is resolves. She still has some aching type of pain that she relates to strain from not seeing well. Resting her eye helps. Photophobia much improved, able to go without sunglasses on cloudy days. Vision not improved. No new flashes, floaters, or diplopia. No new redness, or tearing.     POHx:    No h/o diabetes.     Glasses: Yes  CTL wearer: No    Family hx of eye disease: None  Social Hx: (current every day smoker) smoking, (-) EtOH, (-) illicit drugs    Meds:  [place] QID  [place] QID    Previous: Moxifloxacin q2h alternating, Tobramycin q2h alternating    Surgical Hx:  CEIOL each eye  YPC right eye- 10/11/20    A/P:  #Corneal ulcer, left eye  - Possibly neurotrophic with h/o subacute vision loss over months, now with superimposed bacterial keratitis  - Attempted corneal sensation testing but patient too photophobic  - culture at Dr. Pop's office 10/11/20 were NGTD  - repeat cultures today 10/20/20 (on moxi and tobra for at least 9 days) - P acnes in broth only   - 10/22: ulcer slightly smaller in size (now 1.5x.1.5 mm), remains well circumscribed, no hypopyon, pain improved  - 10/27: ulcer much less dense and smaller, epi defect nearly resolved (0.5 mm), pain improved  - 10/29: stable appearance, residual epi defect (0.5mm)  - 11/3: ulcer continues to improve, epi defect resolved  - 11/9: stable, epi defect resolved. Pt ran out of all drops from 11/3-11/9.    Plan:  - cont. Fortified Vanco and Tobra QID left eye   - Increase Refresh pm ointment 6x/day - use after  drops    Discussed that she may need scleral CL vs K transplant for vision    #Right eye, Dry eye - Systane Ultra PF or Celluvisc right eye every 2-3 hours.    #Pseudophakia each eye  - s/p YPC right eye recently    Follow up: 10 days    Kassy Whitney MD  Cornea & External Disease Fellow    Attending Physician Attestation:  Complete documentation of historical and exam elements from today's encounter can be found in the full encounter summary report (not reduplicated in this progress note).  I personally obtained the chief complaint(s) and history of present illness.  I confirmed and edited as necessary the review of systems, past medical/surgical history, family history, social history, and examination findings as documented by others; and I examined the patient myself.  I personally reviewed the relevant tests, images, and reports as documented above.  I formulated and edited as necessary the assessment and plan and discussed the findings and management plan with the patient and family. - Lv Kapoor MD

## 2020-12-01 ENCOUNTER — OFFICE VISIT (OUTPATIENT)
Dept: OPHTHALMOLOGY | Facility: CLINIC | Age: 70
End: 2020-12-01
Attending: OPHTHALMOLOGY
Payer: COMMERCIAL

## 2020-12-01 DIAGNOSIS — H16.012 CENTRAL CORNEAL ULCER OF LEFT EYE: Primary | ICD-10-CM

## 2020-12-01 DIAGNOSIS — H04.123 DRY EYE SYNDROME OF BOTH EYES: ICD-10-CM

## 2020-12-01 DIAGNOSIS — H16.233 NEUROTROPHIC KERATOCONJUNCTIVITIS OF BOTH EYES: ICD-10-CM

## 2020-12-01 PROCEDURE — 99213 OFFICE O/P EST LOW 20 MIN: CPT | Mod: GC | Performed by: OPHTHALMOLOGY

## 2020-12-01 PROCEDURE — G0463 HOSPITAL OUTPT CLINIC VISIT: HCPCS

## 2020-12-01 RX ORDER — TIZANIDINE HYDROCHLORIDE 2 MG/1
CAPSULE, GELATIN COATED ORAL
COMMUNITY
Start: 2020-11-08

## 2020-12-01 RX ORDER — LAMOTRIGINE 200 MG/1
TABLET ORAL
COMMUNITY
Start: 2020-11-15

## 2020-12-01 RX ORDER — QUETIAPINE FUMARATE 300 MG/1
300 TABLET, FILM COATED ORAL AT BEDTIME
COMMUNITY
Start: 2020-11-08

## 2020-12-01 RX ORDER — LISINOPRIL 10 MG/1
10 TABLET ORAL DAILY
COMMUNITY
Start: 2020-11-08

## 2020-12-01 RX ORDER — POTASSIUM CHLORIDE 1500 MG/1
TABLET, EXTENDED RELEASE ORAL
COMMUNITY
Start: 2020-10-22

## 2020-12-01 RX ORDER — FERROUS SULFATE 325(65) MG
TABLET ORAL
COMMUNITY
Start: 2020-04-03

## 2020-12-01 RX ORDER — MECLIZINE HYDROCHLORIDE 25 MG/1
TABLET ORAL
COMMUNITY
Start: 2020-11-15

## 2020-12-01 ASSESSMENT — TONOMETRY
IOP_METHOD: ICARE
OS_IOP_MMHG: 12
OD_IOP_MMHG: 14

## 2020-12-01 ASSESSMENT — VISUAL ACUITY
OD_PH_SC+: +2
OD_SC: 20/40
OD_SC+: -2
METHOD: SNELLEN - LINEAR
OD_PH_SC: 20/40
OS_SC: 3/200 E

## 2020-12-01 ASSESSMENT — CONF VISUAL FIELD
OS_SUPERIOR_TEMPORAL_RESTRICTION: 3
METHOD: COUNTING FINGERS
OS_SUPERIOR_NASAL_RESTRICTION: 3
OS_INFERIOR_TEMPORAL_RESTRICTION: 3

## 2020-12-01 ASSESSMENT — EXTERNAL EXAM - RIGHT EYE: OD_EXAM: NORMAL

## 2020-12-01 ASSESSMENT — EXTERNAL EXAM - LEFT EYE: OS_EXAM: NORMAL

## 2020-12-01 NOTE — NURSING NOTE
Chief Complaints and History of Present Illnesses   Patient presents with     Corneal Ulcer Follow Up     Chief Complaint(s) and History of Present Illness(es)     Corneal Ulcer Follow Up     Laterality: left eye    Course: stable    Associated symptoms: eye pain (irritation in her left eye), itching, tearing and discharge (white and green discharge, frequent)    Treatments tried: eye drops, artificial tears and ointment    Pain scale: 4/10              Comments       She states that her left eye itches a lot and she has a hard time not picking at it with a Kleenx.  The ointment does give her some relief from the discomfort.    She is using:  - Fortified Vanco and Tobra four times a day left eye   -Refresh pm ointment 3-4x/day in her left eye and twice a day in her right eye  -Systane PF artificial tears 4 times in her right eye    DOE Hernandez 10:40 AM  December 1, 2020

## 2020-12-01 NOTE — PROGRESS NOTES
CC:  Corneal ulcer OS    Referring provider: Tulio Pop    HPI:  Gloria Rinaldi is a(n) 70 year old female who presents for non-healing corneal ulcer. She had vision loss over the course of the past ~2 months. Then over the past week, she developed severe pain, which started last Thursday. Pain is a lot worse today than yesterday. Has been on Moxi and tobra q2h alternating which hasn't helped.     Interval HPI:  Left eye feels about the same as before.  There is no pain but also no improvement in vision.  Photophobia is unchanged and it is difficult to go without sunglasses.  Minimal redness.  Stable tearing.  The eye remains TTP.         POHx:    No h/o diabetes.     Glasses: Yes  CTL wearer: No    Family hx of eye disease: None  Social Hx: (current every day smoker) smoking, (-) EtOH, (-) illicit drugs    Meds:  [employer] QID (started 10/20/20)  [employer] [place] QID (started 10/20/20)    Previous: Moxifloxacin q2h alternating, Tobramycin q2h alternating    Surgical Hx:  CEIOL each eye  YPC right eye- 10/11/20    A/P:  #Corneal ulcer, left eye  - Possibly neurotrophic with h/o subacute vision loss over months, now with superimposed bacterial keratitis  - Attempted corneal sensation testing but patient too photophobic  - culture at Dr. Pop's office 10/11/20 were NGTD  - repeat cultures 10/20/20 (on moxi and tobra for at least 9 days) - P acnes in broth only   - 10/22: ulcer slightly smaller in size (now 1.5x.1.5 mm), remains well circumscribed, no hypopyon, pain improved  - 10/27: ulcer much less dense and smaller, epi defect nearly resolved (0.5 mm), pain improved  - 10/29: stable appearance, residual epi defect (0.5mm)  - 11/3: ulcer continues to improve, epi defect resolved  - 11/9: stable, epi defect resolved. Pt ran out of all drops from 11/3-11/9.  - 12/1: stable scar, no active infiltrate, no epi defect, epi irregularity from drop toxicity  -Disc corneal scar.  - Discussed possible need for PKP option  if scar decreases VA    Plan:  - STOP fortified abx (has been on for 6 weeks)  - Increase Refresh pm ointment 6x/day - use after drops    Discussed that she may need scleral CL vs K transplant for vision    #Right eye, Dry eye - Systane Ultra PF or Celluvisc right eye every 2-3 hours.    #Pseudophakia each eye  - s/p YPC right eye recently    Follow up: 7 days, call if problems.    Masood Martin, PGY3  Ophthalmology Resident    Attending Physician Attestation:  Complete documentation of historical and exam elements from today's encounter can be found in the full encounter summary report (not reduplicated in this progress note).  I personally obtained the chief complaint(s) and history of present illness.  I confirmed and edited as necessary the review of systems, past medical/surgical history, family history, social history, and examination findings as documented by others; and I examined the patient myself.  I personally reviewed the relevant tests, images, and reports as documented above.  I formulated and edited as necessary the assessment and plan and discussed the findings and management plan with the patient and family. - Lv Kapoor MD

## 2021-01-04 ENCOUNTER — TELEPHONE (OUTPATIENT)
Dept: OPHTHALMOLOGY | Facility: CLINIC | Age: 71
End: 2021-01-04

## 2021-01-04 NOTE — TELEPHONE ENCOUNTER
[facility] Call Center    Phone Message    May a detailed message be left on voicemail: yes     Reason for Call: Other: The pt's care giver called. She wants to give the team an update and would like a call back at [phone]. Thanks.     Action Taken: Message routed to:  [facility] ([facility] [place] [facility]     Travel Screening: Not Applicable

## 2021-01-04 NOTE — TELEPHONE ENCOUNTER
1/4/2021 at 4:50 PM: I called Jenniffer (daughter) who updated me that patient had abdominal pain early December that was diagnosed as twisted bowel. S/p three abdominal surgeries which are healing poorly. Patient concerned about her eye prognosis. She hasn't noticed any change in vision, worsening pain, redness, or discharged. I discussed that she has cleared the eye infection, so the most important thing is to continue aggressive lubrication with ointment ~6x/day in the left eye and at bedtime in the right eye. Discussed that no harm in waiting on K transplant (pt desires transplant now). They will call when she is discharged to make appointment.     Kassy Whitney MD  Cornea & External Disease Fellow          The patient's daughter, Jenniffer, called with an update about her Mom.  Her Mom has been in [facility] since December 11 for three abdominal surgeries.    She now has COVID since December 30th.  She is afraid her eye infection is coming back. She wears sunglasses in the hospital for her light sensitivity.  She is not using any eyedrops but OTC drops.  The patient's daughter asked me to cancel her appointments in January at this time.  She also asks if the patient should have care in the hospital for her eyes.